# Patient Record
Sex: FEMALE | Race: WHITE | Employment: FULL TIME | URBAN - METROPOLITAN AREA
[De-identification: names, ages, dates, MRNs, and addresses within clinical notes are randomized per-mention and may not be internally consistent; named-entity substitution may affect disease eponyms.]

---
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
## 2022-01-25 ENCOUNTER — OFFICE VISIT (OUTPATIENT)
Dept: PRIMARY CARE CLINIC | Age: 22
End: 2022-01-25
Payer: COMMERCIAL

## 2022-01-25 VITALS
DIASTOLIC BLOOD PRESSURE: 60 MMHG | HEART RATE: 83 BPM | SYSTOLIC BLOOD PRESSURE: 106 MMHG | OXYGEN SATURATION: 98 % | HEIGHT: 67 IN | RESPIRATION RATE: 16 BRPM | WEIGHT: 152 LBS | BODY MASS INDEX: 23.86 KG/M2 | TEMPERATURE: 97.7 F

## 2022-01-25 DIAGNOSIS — F42.2 MIXED OBSESSIONAL THOUGHTS AND ACTS: ICD-10-CM

## 2022-01-25 DIAGNOSIS — Z00.00 HEALTH MAINTENANCE EXAMINATION: Primary | ICD-10-CM

## 2022-01-25 PROCEDURE — 99213 OFFICE O/P EST LOW 20 MIN: CPT | Performed by: FAMILY MEDICINE

## 2022-01-25 PROCEDURE — 99395 PREV VISIT EST AGE 18-39: CPT | Performed by: FAMILY MEDICINE

## 2022-01-25 RX ORDER — FLUOXETINE HYDROCHLORIDE 20 MG/1
20 CAPSULE ORAL DAILY
Qty: 30 CAPSULE | Refills: 1 | Status: SHIPPED
Start: 2022-01-25 | End: 2022-03-08 | Stop reason: SDUPTHER

## 2022-01-25 ASSESSMENT — PATIENT HEALTH QUESTIONNAIRE - PHQ9
SUM OF ALL RESPONSES TO PHQ QUESTIONS 1-9: 0
SUM OF ALL RESPONSES TO PHQ QUESTIONS 1-9: 0
SUM OF ALL RESPONSES TO PHQ9 QUESTIONS 1 & 2: 0
2. FEELING DOWN, DEPRESSED OR HOPELESS: 0
SUM OF ALL RESPONSES TO PHQ QUESTIONS 1-9: 0
1. LITTLE INTEREST OR PLEASURE IN DOING THINGS: 0
SUM OF ALL RESPONSES TO PHQ QUESTIONS 1-9: 0

## 2022-01-25 NOTE — PROGRESS NOTES
Shantel Tracey : 2000 Sex: female  Age: 25 y.o. Chief Complaint   Patient presents with    Annual Exam       HPI:   Patient presents today for physical as well as to follow-up on anxiety with continued symptoms. Follows with counselor. They are recommended she increase her medicine. Continues to have OCD tendencies. Denies depression SI/HI. Working and in school.  bw reviewed.  Defers repeat until next physical unless sxs    Most Recent Labs  CBC  Lab Results   Component Value Date    WBC 8.2 2021    RBC 4.97 2021    HGB 14.7 2021    HGB 14.1 2015    HGB 11.8 09/10/2012    HCT 42.6 2021    MCV 85.7 2021     2021      CMP  Lab Results   Component Value Date     2021    K 3.6 2021     2021    CO2 26 2021    GLUCOSE 96 2021    GLUCOSE 145 2021    BUN 12 2021    CREATININE 0.94 2021    LABGLOM >60 2021    CALCIUM 8.9 2021    LABALBU 4.6 2021    BILITOT 0.9 2021    ALKPHOS 81 2021    AST 14 2021    ALT 23 2021     A1C  No results found for: LABA1C  TSH  Lab Results   Component Value Date    TSH 0.59 2021     FREET4  No results found for: T4VUWLD  LIPID  Lab Results   Component Value Date    CHOL 176 2021    HDL 60 2021    LDLCALC 98 2021    CHOLHDLRATIO 3 2021     VITAMIN D  No results found for: VITD25  MAGNESIUM  No results found for: MG   PHOS  No results found for: PHOS   JACKY   No results found for: JACKY  RHEUMATOID FACTOR  No results found for: RF  PSA  No results found for: PSA   HEPATITIS C  No results found for: HCVABI  HIV  No results found for: VIT9OIF, HIV1QT  UA  Lab Results   Component Value Date    COLORU Yellow 2021    COLORU yellow 2015    COLORU yellow 09/10/2012    CLARITYU Clear 2021    CLARITYU clear 2015    CLARITYU clear 09/10/2012    GLUCOSEU Negative 2021    GLUCOSEU neg 07/28/2015    GLUCOSEU neg 09/10/2012    BILIRUBINUR Negative 08/23/2021    BILIRUBINUR neg 07/28/2015    BILIRUBINUR neg 09/10/2012    KETUA Negative 08/23/2021    KETUA neg 07/28/2015    KETUA trace* 09/10/2012    SPECGRAV 1.020 07/28/2015    SPECGRAV >=1.030 09/10/2012    BLOODU Negative 08/23/2021    BLOODU neg 07/28/2015    BLOODU neg 09/10/2012    PHUR 6.5 08/23/2021    PHUR 7.5 07/28/2015    PHUR 7.0 09/10/2012    PROTEINU Negative 08/23/2021    PROTEINU neg 07/28/2015    PROTEINU neg 09/10/2012    UROBILINOGEN Normal 08/23/2021    NITRU Negative 08/23/2021    LEUKOCYTESUR Negative 08/23/2021    LEUKOCYTESUR neg 07/28/2015    LEUKOCYTESUR neg 09/10/2012     Urine Micro/Albumin Ratio  No results found for: MALBCR      ROS:  Const: Denies chills, fever, malaise and sweats. Eyes: Denies discharge, pain, redness and visual disturbance. ENMT: Denies earaches, other ear symptoms. Denies nasal or sinus symptoms other than stated  above. Denies mouth and tongue lesions and sore throat. CV: Denies chest discomfort, pain; diaphoresis, dizziness, edema, lightheadedness, orthopnea,  palpitations, syncope and near syncopal episode or any exertional symptoms  Resp: Denies cough, hemoptysis, pleuritic pain, SOB, sputum production and wheezing. GI: Denies abdominal pain, change in bowel habits, hematochezia, melena, nausea and vomiting. : Denies urinary symptoms including dysuria , urgency, frequency or hematuria. Musculo: Denies musculoskeletal symptoms. Skin: Denies bruising and rash. Neuro: Denies headache, numbness, stiff neck, tingling and focal weakness slurred speech or facial  droop  Hema/Lymph: Denies bleeding/bruising tendency and enlarged lymph nodes. Health Maintenance:  Proper diet reviewed including Mediterranean and DASH diets. Counseled on healthy weight, appropriate exercise, avoidance of tobacco, and recommendations for minimal to no alcohol consumption.   Counseled on the potential pros and cons of vitamins and supplements. Reviewed the recommendations and risk/benefits of vaccines including phizer x 2, planning booster;  Td/Tdap (7/15),  pneumovax,  prevnar 13 , flu vaccine (considering), Hepatitis vaccines (had A/B series), gardasil (considering), and shingrix (patient had varicella x 2), meningococcal x 2. Had men B #1 pharmacy, she will find out which one and plan #2 soon. Ultimately planning phizer/flu vaccine soon. 1month later Men B #2 and Start gardasil. HIV and Hep C screening guidelines were reviewed. Importance of regular eye and dental exams and health reviewed. Risks/Benefits of ASA reviewed and discussed latest guidelines. Sun protection reviewed. Notify if any new or changing moles/skin lesions, etc. Dexa Scan indications/ risk factors for osteoporotic fractures and prevention reviewed. Colonoscopy recommendations reviewed. Pt denies change in bowel habits or [address] of colon polyps/CA. Fit test discussed. Indications for EKG and   additional  cardiac testing including referrals, stress testing,  2d echo, ect. Reviewed indications for other testing such as  PFT's.and  indications for imaging including brain, carotid, chest, abdominal, aortic .  dasx    Counseled on instructions regarding, and importance of monthly breast exams, yearly physician exams (sooner if sx's). Indications for mammograms reviewed and importance. Dexa Scan indications/ risk factors for osteoporotic fractures (and associated M/M) and preventative measures reviewed. Importance of regular GYN exams reviewed and pt to follow here or with her gynecologist as directed.   Not sexually active      Current Outpatient Medications:     FLUoxetine (PROZAC) 20 MG capsule, Take 1 capsule by mouth daily, Disp: 30 capsule, Rfl: 1  No Known Allergies    Past Medical History:   Diagnosis Date    Fracture, ankle 07/2018    Right - for OR 7-27-18      Past Surgical History:   Procedure Laterality Date    OPEN TX DISTAL TIBIOFIBULAR JOINT DISRUPTION Right 7/27/2018    RIGHT ANKLE OPEN REDUCTION INTERNAL FIXATION WITH SYNDESMOSIS REPAIR performed by Mirta Lopez MD at [facility] OR     Family History   Problem Relation Age of Onset    High Blood Pressure Mother     High Blood Pressure Father     Asthma Brother      Social History     Tobacco Use    Smoking status: Never Smoker    Smokeless tobacco: Never Used   Substance Use Topics    Alcohol use: No    Drug use: No      Social History     Social History Narrative    [facility] . Planning YSU        Mom - works [employer]    Dad - unemployed from [address] [place] [address] Street:    01/25/22 0836   BP: 106/60   Pulse: 83   Resp: 16   Temp: 97.7 °F (36.5 °C)   TempSrc: Temporal   SpO2: 98%   Weight: 152 lb (68.9 kg)   Height: 5' 7\" (1.702 m)         Exam:  Const: Appears comfortable. No signs of acute distress present. Head/Face: Atraumatic on inspection. Eyes: EOMI in both eyes. No discharge from the eyes. PERRL. Sclerae clear. ENMT: Auditory canals normal. Tympanic membranes: intact and translucent. External nose WNL. Nasal mucosa is clear. Oropharynx: No erythema or exudate. Posterior pharynx is normal.  Neck: Supple. Palpation reveals no adenopathy. No masses appreciated. No JVD. Carotids: no  bruits. Resp: Respirations are unlabored. Clear to auscultation. No rales, rhonchi or wheezes appreciated  over the lungs bilaterally. CV: Rate is regular. Rhythm is regular. No gallop or rubs. No heart murmur appreciated. Extremities: No clubbing, cyanosis, or edema. No calf inflammation or tenderness. Abdomen: Bowel sounds are normoactive. Abdomen is soft, nontender, and nondistended. No  abdominal masses. No palpable hepatosplenomegaly. Lymph: No palpable or visible regional lymphadenopathy. Musculoskeletal: no acute joint inflammation. Skin: Dry and warm with no rash. Skin normal to inspection and palpation overall. Neuro: Alert and oriented.  Affect: appropriate. Upper Extremities: 5/5 bilaterally. Lower Extremities:  5/5 bilaterally. Sensation intact to light touch. Reflexes: DTR's are symmetric and 2+ bilaterally. .  Cranial Nerves: Cranial nerves grossly intact. Office Labs This Visit :  No results found for this visit on 01/25/22. Assessment and Plan:    Diagnosis Orders   1. Health maintenance examination     2. Mixed obsessional thoughts and acts  FLUoxetine (PROZAC) 20 MG capsule           Health maintenance examination  Health maintenance issues discussed at length   as above, 1/25/2022. Encourage yearly. She is going to go to the pharmacy soon to get Monzon Peter booster and flu vaccine. After 1 months she is planning meningitis B #2 and starting Gardasil series. Mixed obsessional thoughts and acts  Counseled, differential reviewed. Symptoms are primarily OCD with anxiety. She states the OCD became worse in college. Increase fluoxetine to 20 mg daily. Continue formal counseling. Standard precautions. Side effects instructions ADRs reviewed. Adamantly denies SI/HI        Plan as above. Counseled extensively and differential diagnoses relevant to above were reviewed, including serious etiologies. Side effects and interactions of medications were reviewed. Full health maintenance issues addressed as above as well as other medical concerns and diagnoses as above at today's visit. Increase fluoxetine to 20 mg daily. She is going to follow-up in 6 weeks sooner as needed. At that point if 1 month have passed since Monzon Peter booster/flu vaccine she is going to get meningitis B #2 (she will find out which one she had at the pharmacy for #1) and start Gardasil series. As long as symptoms steadily improve/resolve, and medical conditions follow the expected course, FU as below, sooner PRN. Return in about 6 weeks (around 3/8/2022) for fluoxetine fu, consider Men B #2 and Start Gardasil.     Over 30 minutes  spent with the patient in reviewing records, reviewing with patient/family, counseling, ordering,  prescribing, completing h&p, etc., with over 50% of the time spent face to face counseling. Signs and symptoms to watch for discussed, serious signs and symptoms reviewed. ER if any. Laura Ayon MD    Patients are advised to check with insurance company to ensure coverage and to fully understand benefits and cost prior to any testing. This note was created with the assistance of voice recognition software. Document was reviewed however may contain grammatical errors.

## 2022-03-08 ENCOUNTER — OFFICE VISIT (OUTPATIENT)
Dept: PRIMARY CARE CLINIC | Age: 22
End: 2022-03-08
Payer: COMMERCIAL

## 2022-03-08 VITALS
WEIGHT: 158.6 LBS | SYSTOLIC BLOOD PRESSURE: 120 MMHG | HEART RATE: 83 BPM | OXYGEN SATURATION: 97 % | TEMPERATURE: 97.2 F | DIASTOLIC BLOOD PRESSURE: 68 MMHG | BODY MASS INDEX: 24.84 KG/M2

## 2022-03-08 DIAGNOSIS — F42.2 MIXED OBSESSIONAL THOUGHTS AND ACTS: ICD-10-CM

## 2022-03-08 PROCEDURE — 99213 OFFICE O/P EST LOW 20 MIN: CPT | Performed by: FAMILY MEDICINE

## 2022-03-08 RX ORDER — FLUOXETINE HYDROCHLORIDE 20 MG/1
20 CAPSULE ORAL DAILY
Qty: 90 CAPSULE | Refills: 1 | Status: SHIPPED
Start: 2022-03-08 | End: 2022-06-24 | Stop reason: SDUPTHER

## 2022-03-08 SDOH — ECONOMIC STABILITY: FOOD INSECURITY: WITHIN THE PAST 12 MONTHS, YOU WORRIED THAT YOUR FOOD WOULD RUN OUT BEFORE YOU GOT MONEY TO BUY MORE.: NEVER TRUE

## 2022-03-08 SDOH — ECONOMIC STABILITY: FOOD INSECURITY: WITHIN THE PAST 12 MONTHS, THE FOOD YOU BOUGHT JUST DIDN'T LAST AND YOU DIDN'T HAVE MONEY TO GET MORE.: NEVER TRUE

## 2022-03-08 ASSESSMENT — SOCIAL DETERMINANTS OF HEALTH (SDOH): HOW HARD IS IT FOR YOU TO PAY FOR THE VERY BASICS LIKE FOOD, HOUSING, MEDICAL CARE, AND HEATING?: NOT HARD AT ALL

## 2022-03-08 NOTE — PROGRESS NOTES
Demarcus Newellcolin : 2000 Sex: female  Age: 25 y.o. Chief Complaint   Patient presents with    Discuss Medications       HPI:   Patient presents today for follow-up. Did not get any of her vaccines yet but still plan to do so. Tolerating fluoxetine very well.       Deferring repeat blood work until next physical  Most Recent Labs  CBC  Lab Results   Component Value Date    WBC 8.2 2021    RBC 4.97 2021    HGB 14.7 2021    HGB 14.1 2015    HGB 11.8 09/10/2012    HCT 42.6 2021    MCV 85.7 2021     2021      CMP  Lab Results   Component Value Date     2021    K 3.6 2021     2021    CO2 26 2021    GLUCOSE 96 2021    GLUCOSE 145 2021    BUN 12 2021    CREATININE 0.94 2021    LABGLOM >60 2021    CALCIUM 8.9 2021    LABALBU 4.6 2021    BILITOT 0.9 2021    ALKPHOS 81 2021    AST 14 2021    ALT 23 2021     A1C  No results found for: LABA1C  TSH  Lab Results   Component Value Date    TSH 0.59 2021     FREET4  No results found for: [id number]  LIPID  Lab Results   Component Value Date    CHOL 176 2021    HDL 60 2021    LDLCALC 98 2021    CHOLHDLRATIO 3 2021     VITAMIN D  No results found for: VITD25  MAGNESIUM  No results found for: MG   PHOS  No results found for: PHOS   JACKY   No results found for: JACKY  RHEUMATOID FACTOR  No results found for: RF  PSA  No results found for: PSA   HEPATITIS C  No results found for: HCVABI  HIV  No results found for: KJE8RMX, HIV1QT  UA  Lab Results   Component Value Date    COLORU Yellow 2021    COLORU yellow 2015    COLORU yellow 09/10/2012    CLARITYU Clear 2021    CLARITYU clear 2015    CLARITYU clear 09/10/2012    GLUCOSEU Negative 2021    GLUCOSEU neg 2015    GLUCOSEU neg 09/10/2012    BILIRUBINUR Negative 2021    BILIRUBINUR neg 2015    BILIRUBINUR neg 09/10/2012    KETUA Negative 08/23/2021    KETUA neg 07/28/2015    KETUA trace* 09/10/2012    SPECGRAV 1.020 07/28/2015    SPECGRAV >=1.030 09/10/2012    BLOODU Negative 08/23/2021    BLOODU neg 07/28/2015    BLOODU neg 09/10/2012    PHUR 6.5 08/23/2021    PHUR 7.5 07/28/2015    PHUR 7.0 09/10/2012    PROTEINU Negative 08/23/2021    PROTEINU neg 07/28/2015    PROTEINU neg 09/10/2012    UROBILINOGEN Normal 08/23/2021    NITRU Negative 08/23/2021    LEUKOCYTESUR Negative 08/23/2021    LEUKOCYTESUR neg 07/28/2015    LEUKOCYTESUR neg 09/10/2012     Urine Micro/Albumin Ratio  No results found for: MALBCR      ROS:  Const: Denies chills, fever, malaise and sweats. Eyes: Denies discharge, pain, redness and visual disturbance. ENMT: Denies earaches, other ear symptoms. Denies nasal or sinus symptoms other than stated  above. Denies mouth and tongue lesions and sore throat. CV: Denies chest discomfort, pain; diaphoresis, dizziness, edema, lightheadedness, orthopnea,  palpitations, syncope and near syncopal episode or any exertional symptoms  Resp: Denies cough, hemoptysis, pleuritic pain, SOB, sputum production and wheezing. GI: Denies abdominal pain, change in bowel habits, hematochezia, melena, nausea and vomiting. : Denies urinary symptoms including dysuria , urgency, frequency or hematuria. Musculo: Denies musculoskeletal symptoms. Skin: Denies bruising and rash. Neuro: Denies headache, numbness, stiff neck, tingling and focal weakness slurred speech or facial  droop  Hema/Lymph: Denies bleeding/bruising tendency and enlarged lymph nodes. Health Maintenance:  Reviewed the recommendations and risk/benefits of vaccines including phizer x 2, planning booster;  Td/Tdap (7/15),  pneumovax,  prevnar 13 , flu vaccine (considering), Hepatitis vaccines (had A/B series), gardasil (considering), and shingrix (patient had varicella x 2), meningococcal x 2.  Had men B #1 pharmacy, she will find out which one and plan #2 soon. Ultimately planning phizer/flu vaccine soon. Afterward, men B #2 and Start gardasil. Current Outpatient Medications:     FLUoxetine (PROZAC) 20 MG capsule, Take 1 capsule by mouth daily, Disp: 90 capsule, Rfl: 1  No Known Allergies    Past Medical History:   Diagnosis Date    Fracture, ankle 07/2018    Right - for OR 7-27-18      Past Surgical History:   Procedure Laterality Date    OPEN TX DISTAL TIBIOFIBULAR JOINT DISRUPTION Right 7/27/2018    RIGHT ANKLE OPEN REDUCTION INTERNAL FIXATION WITH SYNDESMOSIS REPAIR performed by Horace Padilla [facility]     Family History   Problem Relation Age of Onset    High Blood Pressure Mother     High Blood Pressure Father     Asthma Brother      Social History     Tobacco Use    Smoking status: Never Smoker    Smokeless tobacco: Never Used   Substance Use Topics    Alcohol use: No    Drug use: No      Social History     Social History Narrative    [facility] . Planning YSU        Mom - works [employer]    Dad - unemployed from [address] [place] [address] Street:    03/08/22 1438   BP: 120/68   Site: Left Upper Arm   Position: Sitting   Pulse: 83   Temp: 97.2 °F (36.2 °C)   TempSrc: Temporal   SpO2: 97%   Weight: 158 lb 9.6 oz (71.9 kg)         Exam:  Const: Appears comfortable. No signs of acute distress present. Head/Face: Atraumatic on inspection. Eyes: EOMI in both eyes. No discharge from the eyes. PERRL. Sclerae clear. ENMT: Auditory canals normal. Tympanic membranes: intact and translucent. External nose WNL. Nasal mucosa is clear. Oropharynx: No erythema or exudate. Posterior pharynx is normal.  Neck: Supple. Palpation reveals no adenopathy. No masses appreciated. No JVD. Carotids: no  bruits. Resp: Respirations are unlabored. Clear to auscultation. No rales, rhonchi or wheezes appreciated  over the lungs bilaterally. CV: Rate is regular. Rhythm is regular. No gallop or rubs. No heart murmur appreciated.   Extremities: No clubbing, cyanosis, or edema. No calf inflammation or tenderness. Abdomen: Bowel sounds are normoactive. Abdomen is soft, nontender, and nondistended. No  abdominal masses. No palpable hepatosplenomegaly. Lymph: No palpable or visible regional lymphadenopathy. Musculoskeletal: no acute joint inflammation. Skin: Dry and warm with no rash. Skin normal to inspection and palpation overall. Neuro: Alert and oriented. Affect: appropriate. Upper Extremities: 5/5 bilaterally. Lower Extremities:  5/5 bilaterally. Sensation intact to light touch. Reflexes: DTR's are symmetric and 2+ bilaterally. .  Cranial Nerves: Cranial nerves grossly intact. Office Labs This Visit :  No results found for this visit on 03/08/22. Assessment and Plan:    Diagnosis Orders   1. Mixed obsessional thoughts and acts  FLUoxetine (PROZAC) 20 MG capsule           Health maintenance examination  Health maintenance issues discussed at length  1/25/2022. Encourage yearly. She is going to go to the pharmacy soon to get Monzon Peter booster and flu vaccine. She had her first meningitis B vaccine at [facility], she is going to find out which when she had. She is going to follow-up in about 3 months for  meningitis B #2 and start Gardasil series. Then she is planning nursing visits for Gardasil series with a follow-up 1 year from last physical sooner as needed    Mixed obsessional thoughts and acts  Counseled, differential reviewed. Symptoms are primarily OCD with anxiety. She states the OCD became worse in college. Doing much better with increased fluoxetine of 20 mg daily. Continue formal counseling. Standard precautions. Side effects instructions ADRs reviewed. Adamantly denies SI/HI. Refills given        Plan as above. Counseled extensively and differential diagnoses relevant to above were reviewed, including serious etiologies. Side effects and interactions of medications were reviewed.     Counseled, refills of fluoxetine given,, Going to follow-up in 3 months and we are planning meningitis B #2 and Gardasil series initiation then, then she is planning nursing visits for remainder Gardasil series and follow-up with me 1 year from last physical i.e. January 2023, sooner as needed. She is planning Covid booster and flu shot soon  As long as symptoms steadily improve/resolve, and medical conditions follow the expected course, FU as below, sooner PRN. Return in about 3 months (around 6/8/2022), or if symptoms worsen or fail to improve, for fu fluoxetine and plan MEN B #2 and start gardasil then. Signs and symptoms to watch for discussed, serious signs and symptoms reviewed. ER if any. Anahi Ly MD    Patients are advised to check with insurance company to ensure coverage and to fully understand benefits and cost prior to any testing. This note was created with the assistance of voice recognition software. Document was reviewed however may contain grammatical errors.

## 2022-06-24 ENCOUNTER — OFFICE VISIT (OUTPATIENT)
Dept: PRIMARY CARE CLINIC | Age: 22
End: 2022-06-24
Payer: COMMERCIAL

## 2022-06-24 VITALS
DIASTOLIC BLOOD PRESSURE: 62 MMHG | OXYGEN SATURATION: 97 % | HEART RATE: 75 BPM | WEIGHT: 154 LBS | SYSTOLIC BLOOD PRESSURE: 122 MMHG | TEMPERATURE: 97.3 F | BODY MASS INDEX: 24.12 KG/M2

## 2022-06-24 DIAGNOSIS — F42.2 MIXED OBSESSIONAL THOUGHTS AND ACTS: Primary | ICD-10-CM

## 2022-06-24 DIAGNOSIS — Z00.00 HEALTH MAINTENANCE EXAMINATION: ICD-10-CM

## 2022-06-24 PROCEDURE — 99214 OFFICE O/P EST MOD 30 MIN: CPT | Performed by: FAMILY MEDICINE

## 2022-06-24 PROCEDURE — 90471 IMMUNIZATION ADMIN: CPT | Performed by: FAMILY MEDICINE

## 2022-06-24 PROCEDURE — 90651 9VHPV VACCINE 2/3 DOSE IM: CPT | Performed by: FAMILY MEDICINE

## 2022-06-24 RX ORDER — FLUOXETINE HYDROCHLORIDE 40 MG/1
40 CAPSULE ORAL DAILY
Qty: 30 CAPSULE | Refills: 1 | Status: SHIPPED | OUTPATIENT
Start: 2022-06-24

## 2022-06-24 NOTE — PROGRESS NOTES
Wallace Clause : 2000 Sex: female  Age: 25 y.o. Chief Complaint   Patient presents with    Medication Check    Immunizations       HPI:   Patient presents today for follow-up. She did get confirmation on the meningitis B vaccine that she got at [facility] but again cannot recall which 1. She is planning to get her booster there. However would like to start Gardasil today. I do generally recommend  shots by 1 month if not given together. Anxiety much better on fluoxetine and tolerating it well but still significant rituals and OCD habits. Follows with counselor.     Deferring repeat blood work until next physical  Most Recent Labs  CBC  Lab Results   Component Value Date    WBC 8.2 2021    RBC 4.97 2021    HGB 14.7 2021    HGB 14.1 2015    HGB 11.8 09/10/2012    HCT 42.6 2021    MCV 85.7 2021     2021      CMP  Lab Results   Component Value Date     2021    K 3.6 2021     2021    CO2 26 2021    GLUCOSE 96 2021    GLUCOSE 145 2021    BUN 12 2021    CREATININE 0.94 2021    LABGLOM >60 2021    CALCIUM 8.9 2021    LABALBU 4.6 2021    BILITOT 0.9 2021    ALKPHOS 81 2021    AST 14 2021    ALT 23 2021     A1C  No results found for: LABA1C  TSH  Lab Results   Component Value Date    TSH 0.59 2021     FREET4  No results found for: [id number]  LIPID  Lab Results   Component Value Date    CHOL 176 2021    HDL 60 2021    LDLCALC 98 2021    CHOLHDLRATIO 3 2021     VITAMIN D  No results found for: VITD25  MAGNESIUM  No results found for: MG   PHOS  No results found for: PHOS   JACKY   No results found for: JACKY  RHEUMATOID FACTOR  No results found for: RF  PSA  No results found for: PSA   HEPATITIS C  No results found for: HCVABI  HIV  No results found for: IQE6FLH, HIV1QT  UA  Lab Results   Component Value Date    COLORU Yellow 08/23/2021    COLORU yellow 07/28/2015    COLORU yellow 09/10/2012    CLARITYU Clear 08/23/2021    CLARITYU clear 07/28/2015    CLARITYU clear 09/10/2012    GLUCOSEU Negative 08/23/2021    GLUCOSEU neg 07/28/2015    GLUCOSEU neg 09/10/2012    BILIRUBINUR Negative 08/23/2021    BILIRUBINUR neg 07/28/2015    BILIRUBINUR neg 09/10/2012    KETUA Negative 08/23/2021    KETUA neg 07/28/2015    KETUA trace* 09/10/2012    SPECGRAV 1.020 07/28/2015    SPECGRAV >=1.030 09/10/2012    BLOODU Negative 08/23/2021    BLOODU neg 07/28/2015    BLOODU neg 09/10/2012    PHUR 6.5 08/23/2021    PHUR 7.5 07/28/2015    PHUR 7.0 09/10/2012    PROTEINU Negative 08/23/2021    PROTEINU neg 07/28/2015    PROTEINU neg 09/10/2012    UROBILINOGEN Normal 08/23/2021    NITRU Negative 08/23/2021    LEUKOCYTESUR Negative 08/23/2021    LEUKOCYTESUR neg 07/28/2015    LEUKOCYTESUR neg 09/10/2012     Urine Micro/Albumin Ratio  No results found for: MALBCR      ROS:  Const: Denies chills, fever, malaise and sweats. Eyes: Denies discharge, pain, redness and visual disturbance. ENMT: Denies earaches, other ear symptoms. Denies nasal or sinus symptoms other than stated  above. Denies mouth and tongue lesions and sore throat. CV: Denies chest discomfort, pain; diaphoresis, dizziness, edema, lightheadedness, orthopnea,  palpitations, syncope and near syncopal episode or any exertional symptoms  Resp: Denies cough, hemoptysis, pleuritic pain, SOB, sputum production and wheezing. GI: Denies abdominal pain, change in bowel habits, hematochezia, melena, nausea and vomiting. : Denies urinary symptoms including dysuria , urgency, frequency or hematuria. Musculo: Denies musculoskeletal symptoms. Skin: Denies bruising and rash. Neuro: Denies headache, numbness, stiff neck, tingling and focal weakness slurred speech or facial  droop  Hema/Lymph: Denies bleeding/bruising tendency and enlarged lymph nodes.       Health Maintenance:  Reviewed the recommendations and risk/benefits of vaccines including phizer x 2, planning booster;  Td/Tdap (7/15),  pneumovax,  prevnar 13 , flu vaccine (considering), Hepatitis vaccines (had A/B series), gardasil (she would like to start), and shingrix (patient had varicella x 2), meningococcal x 2. Had men B #1 pharmacy, she will get booster through pharmacy she states, offered her if she knew which 1 she got    Current Outpatient Medications:     FLUoxetine (PROZAC) 40 MG capsule, Take 1 capsule by mouth daily, Disp: 30 capsule, Rfl: 1  No Known Allergies    Past Medical History:   Diagnosis Date    Fracture, ankle 07/2018    Right - for OR 7-27-18      Past Surgical History:   Procedure Laterality Date    OPEN TX DISTAL TIBIOFIBULAR JOINT DISRUPTION Right 7/27/2018    RIGHT ANKLE OPEN REDUCTION INTERNAL FIXATION WITH SYNDESMOSIS REPAIR performed by Jake Christopher MD at [facility] OR     Family History   Problem Relation Age of Onset    High Blood Pressure Mother     High Blood Pressure Father     Asthma Brother      Social History     Tobacco Use    Smoking status: Never Smoker    Smokeless tobacco: Never Used   Substance Use Topics    Alcohol use: No    Drug use: No      Social History     Social History Narrative    [facility]es [facility] . Planning YSU        Mom - works [employer]    Dad - unemployed from [address] [place] [address] Street:    06/24/22 1018   BP: 122/62   Pulse: 75   Temp: 97.3 °F (36.3 °C)   SpO2: 97%   Weight: 154 lb (69.9 kg)         Exam:  Const: Appears comfortable. No signs of acute distress present. Head/Face: Atraumatic on inspection. Eyes: EOMI in both eyes. No discharge from the eyes. PERRL. Sclerae clear. ENMT: Auditory canals normal. Tympanic membranes: intact and translucent. External nose WNL. Nasal mucosa is clear. Oropharynx: No erythema or exudate. Posterior pharynx is normal.  Neck: Supple. Palpation reveals no adenopathy. No masses appreciated. No JVD. Carotids: no  bruits.   Resp: Respirations are unlabored. Clear to auscultation. No rales, rhonchi or wheezes appreciated  over the lungs bilaterally. CV: Rate is regular. Rhythm is regular. No gallop or rubs. No heart murmur appreciated. Extremities: No clubbing, cyanosis, or edema. No calf inflammation or tenderness. Abdomen: Bowel sounds are normoactive. Abdomen is soft, nontender, and nondistended. No  abdominal masses. No palpable hepatosplenomegaly. Lymph: No palpable or visible regional lymphadenopathy. Musculoskeletal: no acute joint inflammation. Skin: Dry and warm with no rash. Skin normal to inspection and palpation overall. Neuro: Alert and oriented. Affect: appropriate. Upper Extremities: 5/5 bilaterally. Lower Extremities:  5/5 bilaterally. Sensation intact to light touch. Reflexes: DTR's are symmetric and 2+ bilaterally. .  Cranial Nerves: Cranial nerves grossly intact. Office Labs This Visit :  No results found for this visit on 06/24/22. Assessment and Plan:    Diagnosis Orders   1. Mixed obsessional thoughts and acts  FLUoxetine (PROZAC) 40 MG capsule   2. Health maintenance examination  HPV, GARDASIL 9, (AGE 9-45 YRS), IM           Health maintenance examination  Health maintenance issues discussed at length  1/25/2022. Encourage yearly. She had her first meningitis B vaccine at [facility], she is going to get booster there, separate vaccines by at least a month if not given together, would like to start Gardasil today      Mixed obsessional thoughts and acts  Counseled, differential reviewed. Symptoms are primarily OCD with anxiety. She states the OCD became worse in college. Anxiety is much better with increased fluoxetine of 20 mg daily. ,  However OCD rituals still persistent. She would like to try 40 mg, pros and cons reviewed. Continue formal counseling. Standard precautions. Side effects instructions ADRs reviewed. Adamantly denies SI/HI. Prescription given        Plan as above.   Counseled extensively and differential diagnoses relevant to above were reviewed, including serious etiologies, risks and complications, especially of left uncontrolled. If relevant, instructions and  alternatives to meds/treatment reviewed, as well as interactions, and  SE's/ADRs reviewed, notify immediately if any, discontinuing new meds if any. Plan made after discussion and shared decision making. Counseled, increase fluoxetine, start Gardasil series, follow-up 3 weeks on fluoxetine sooner as needed, 2 months for Gardasil No. 2 in 6 months for Gardasil No. 3.  She will get meningitis B vaccine at pharmacy unless she finds out which when she got we can give it here      As long as symptoms steadily improve/resolve, and medical conditions follow the expected course, FU as below, sooner PRN. Return for 3wk fu fluoxetine, 2mos gardasil #2, 6mos gardasil 3. Educational materials and/or home exercises printed for patient's review and were included in patient instructions on his/her After Visit Summary and given to patient at the end of visit. After discussion, patient and/or guardian verbalizes understanding, agrees, feels comfortable with and wishes to proceed with above treatment plan. Call for any pending results, FU sooner if abnormal, as needed or if any current symptoms persist/worsen. Advised patient to call with any new medication issues, and read all Rx info from pharmacy to assure aware of all possible risks and side effects of medication before taking. Reviewed age and gender appropriate health screening exams and vaccinations. Advised patient regarding importance of keeping up with recommended health maintenance and to schedule as soon as possible if overdue, as this is important in assessing for undiagnosed pathology, especially cancer, as well as protecting against potentially harmful/life threatening disease.           Patient and/or guardian verbalizes understanding and agrees with above counseling, assessment and plan. All questions answered. Over 30 minutes  spent with the patient in reviewing records, reviewing with patient/family, counseling, ordering,  prescribing, completing h&p, etc., with over 50% of the time spent face to face counseling. Signs and symptoms to watch for discussed, serious signs and symptoms reviewed. ER if any. Chano Osborn MD    Patients are advised to check with insurance company to ensure coverage and to fully understand benefits and cost prior to any testing. This note was created with the assistance of voice recognition software. Document was reviewed however may contain grammatical errors.

## 2022-07-15 ENCOUNTER — OFFICE VISIT (OUTPATIENT)
Dept: PRIMARY CARE CLINIC | Age: 22
End: 2022-07-15
Payer: COMMERCIAL

## 2022-07-15 VITALS
BODY MASS INDEX: 24.45 KG/M2 | TEMPERATURE: 97.5 F | OXYGEN SATURATION: 98 % | HEIGHT: 67 IN | RESPIRATION RATE: 18 BRPM | WEIGHT: 155.8 LBS | SYSTOLIC BLOOD PRESSURE: 102 MMHG | DIASTOLIC BLOOD PRESSURE: 70 MMHG | HEART RATE: 72 BPM

## 2022-07-15 DIAGNOSIS — F42.2 MIXED OBSESSIONAL THOUGHTS AND ACTS: Primary | ICD-10-CM

## 2022-07-15 DIAGNOSIS — R73.9 HYPERGLYCEMIA: ICD-10-CM

## 2022-07-15 DIAGNOSIS — Z00.00 HEALTH MAINTENANCE EXAMINATION: ICD-10-CM

## 2022-07-15 PROCEDURE — 99213 OFFICE O/P EST LOW 20 MIN: CPT | Performed by: FAMILY MEDICINE

## 2022-07-15 ASSESSMENT — PATIENT HEALTH QUESTIONNAIRE - PHQ9
SUM OF ALL RESPONSES TO PHQ QUESTIONS 1-9: 1
1. LITTLE INTEREST OR PLEASURE IN DOING THINGS: 1
SUM OF ALL RESPONSES TO PHQ QUESTIONS 1-9: 1
SUM OF ALL RESPONSES TO PHQ QUESTIONS 1-9: 1
2. FEELING DOWN, DEPRESSED OR HOPELESS: 0
SUM OF ALL RESPONSES TO PHQ9 QUESTIONS 1 & 2: 1
SUM OF ALL RESPONSES TO PHQ QUESTIONS 1-9: 1

## 2022-07-15 NOTE — PROGRESS NOTES
Denny Mccray : 2000 Sex: female  Age: 25 y.o. Chief Complaint   Patient presents with    Medication Check     Pt states she really likes the medication and it is helping. HPI:   Patient presents today for follow-up. She did not yet get confirmation which meningitis B vaccine she got at [facility]. She is planning to get a booster there. Last visit fluoxetine was increased, she is tolerating well and her OCD rituals have significantly improved as has her anxiety. She is pleased with the results. Follows with counselor.   No SI/HI      Deferring repeat blood work until next physical  Most Recent Labs  CBC  Lab Results   Component Value Date/Time    WBC 8.2 2021 12:00 AM    RBC 4.97 2021 12:00 AM    HGB 14.7 2021 12:00 AM    HGB 14.1 2015 12:00 AM    HGB 11.8 09/10/2012 04:13 PM    HCT 42.6 2021 12:00 AM    MCV 85.7 2021 12:00 AM     2021 12:00 AM      CMP  Lab Results   Component Value Date/Time     2021 12:00 AM    K 3.6 2021 12:00 AM     2021 12:00 AM    CO2 26 2021 12:00 AM    GLUCOSE 96 2021 09:48 AM    GLUCOSE 145 2021 12:00 AM    BUN 12 2021 12:00 AM    CREATININE 0.94 2021 12:00 AM    LABGLOM >60 2021 12:00 AM    CALCIUM 8.9 2021 12:00 AM    LABALBU 4.6 2021 12:00 AM    BILITOT 0.9 2021 12:00 AM    ALKPHOS 81 2021 12:00 AM    AST 14 2021 12:00 AM    ALT 23 2021 12:00 AM     A1C  No results found for: LABA1C  TSH  Lab Results   Component Value Date/Time    TSH 0.59 2021 12:00 AM     FREET4  No results found for: [id number]  LIPID  Lab Results   Component Value Date/Time    CHOL 176 2021 12:00 AM    HDL 60 2021 12:00 AM    LDLCALC 98 2021 12:00 AM    CHOLHDLRATIO 3 2021 12:00 AM     VITAMIN D  No results found for: VITD25  MAGNESIUM  No results found for: MG   PHOS  No results found for: PHOS   JACKY   No results found for: JACKY  RHEUMATOID FACTOR  No results found for: RF  PSA  No results found for: PSA   HEPATITIS C  No results found for: HCVABI  HIV  No results found for: UOZ1AZX, HIV1QT  UA  Lab Results   Component Value Date/Time    COLORU Yellow 08/23/2021 12:00 AM    COLORU yellow 07/28/2015 12:00 AM    COLORU yellow 09/10/2012 04:15 PM    CLARITYU Clear 08/23/2021 12:00 AM    CLARITYU clear 07/28/2015 12:00 AM    CLARITYU clear 09/10/2012 04:15 PM    GLUCOSEU Negative 08/23/2021 12:00 AM    GLUCOSEU neg 07/28/2015 12:00 AM    GLUCOSEU neg 09/10/2012 04:15 PM    BILIRUBINUR Negative 08/23/2021 12:00 AM    BILIRUBINUR neg 07/28/2015 12:00 AM    BILIRUBINUR neg 09/10/2012 04:15 PM    KETUA Negative 08/23/2021 12:00 AM    KETUA neg 07/28/2015 12:00 AM    KETUA trace* 09/10/2012 04:15 PM    SPECGRAV 1.020 07/28/2015 12:00 AM    SPECGRAV >=1.030 09/10/2012 04:15 PM    BLOODU Negative 08/23/2021 12:00 AM    BLOODU neg 07/28/2015 12:00 AM    BLOODU neg 09/10/2012 04:15 PM    PHUR 6.5 08/23/2021 12:00 AM    PHUR 7.5 07/28/2015 12:00 AM    PHUR 7.0 09/10/2012 04:15 PM    PROTEINU Negative 08/23/2021 12:00 AM    PROTEINU neg 07/28/2015 12:00 AM    PROTEINU neg 09/10/2012 04:15 PM    UROBILINOGEN Normal 08/23/2021 12:00 AM    NITRU Negative 08/23/2021 12:00 AM    LEUKOCYTESUR Negative 08/23/2021 12:00 AM    LEUKOCYTESUR neg 07/28/2015 12:00 AM    LEUKOCYTESUR neg 09/10/2012 04:15 PM     Urine Micro/Albumin Ratio  No results found for: MALBCR      ROS:  Const: Denies chills, fever, malaise and sweats. Eyes: Denies discharge, pain, redness and visual disturbance. ENMT: Denies earaches, other ear symptoms. Denies nasal or sinus symptoms other than stated  above. Denies mouth and tongue lesions and sore throat.   CV: Denies chest discomfort, pain; diaphoresis, dizziness, edema, lightheadedness, orthopnea,  palpitations, syncope and near syncopal episode or any exertional symptoms  Resp: Denies cough, hemoptysis, pleuritic pain, SOB, sputum production and wheezing. GI: Denies abdominal pain, change in bowel habits, hematochezia, melena, nausea and vomiting. : Denies urinary symptoms including dysuria , urgency, frequency or hematuria. Musculo: Denies musculoskeletal symptoms. Skin: Denies bruising and rash. Neuro: Denies headache, numbness, stiff neck, tingling and focal weakness slurred speech or facial  droop  Hema/Lymph: Denies bleeding/bruising tendency and enlarged lymph nodes. Current Outpatient Medications:     FLUoxetine (PROZAC) 40 MG capsule, Take 1 capsule by mouth daily, Disp: 30 capsule, Rfl: 1  No Known Allergies    Past Medical History:   Diagnosis Date    Fracture, ankle 07/2018    Right - for OR 7-27-18      Past Surgical History:   Procedure Laterality Date    OPEN TX DISTAL TIBIOFIBULAR JOINT DISRUPTION Right 7/27/2018    RIGHT ANKLE OPEN REDUCTION INTERNAL FIXATION WITH SYNDESMOSIS REPAIR performed by Fernie Saavedra MD at [facility] OR     Family History   Problem Relation Age of Onset    High Blood Pressure Mother     High Blood Pressure Father     Asthma Brother      Social History     Tobacco Use    Smoking status: Never    Smokeless tobacco: Never   Substance Use Topics    Alcohol use: No    Drug use: No      Social History     Social History Narrative    [facility]es [facility] [employer] [facility] . Planning YSU        Mom - works [employer]    Dad - unemployed from [address] [place] [address] Street:    07/15/22 1131   BP: 102/70   Pulse: 72   Resp: 18   Temp: 97.5 °F (36.4 °C)   TempSrc: Temporal   SpO2: 98%   Weight: 155 lb 12.8 oz (70.7 kg)   Height: 5' 7\" (1.702 m)         Exam:  Const: Appears comfortable. No signs of acute distress present. Head/Face: Atraumatic on inspection. Eyes: EOMI in both eyes. No discharge from the eyes. PERRL. Sclerae clear. ENMT: Auditory canals normal. Tympanic membranes: intact and translucent. External nose WNL. Nasal mucosa is clear. Oropharynx: No erythema or exudate.  Posterior pharynx is better with increased fluoxetine of 20 mg daily. ,  However OCD rituals still persistent. Now resolved on 40 mg, pros and cons reviewed. Continue formal counseling. Standard precautions. Side effects instructions ADRs reviewed. Adamantly denies SI/HI. Prescription given    Hyperglycemia  This was nonfasting after significant sugar intake, repeat the next day was 96. Defers blood work until January    Plan as above. Counseled extensively and differential diagnoses relevant to above were reviewed, including serious etiologies, risks and complications, especially of left uncontrolled. If relevant, instructions and  alternatives to meds/treatment reviewed, as well as interactions, and  SE's/ADRs reviewed, notify immediately if any, discontinuing new meds if any. Plan made after discussion and shared decision making. Continue current regimen, follow-up as below and then yearly. As long as symptoms steadily improve/resolve, and medical conditions follow the expected course, FU as below, sooner PRN. Return for >8/24 , gardasil #2 (review fluoxetine); >1/25 for physical and gardasil #3. Educational materials and/or home exercises printed for patient's review and were included in patient instructions on his/her After Visit Summary and given to patient at the end of visit. After discussion, patient and/or guardian verbalizes understanding, agrees, feels comfortable with and wishes to proceed with above treatment plan. Call for any pending results, FU sooner if abnormal, as needed or if any current symptoms persist/worsen. Advised patient to call with any new medication issues, and read all Rx info from pharmacy to assure aware of all possible risks and side effects of medication before taking. Reviewed age and gender appropriate health screening exams and vaccinations.   Advised patient regarding importance of keeping up with recommended health maintenance and to schedule as soon as possible if overdue, as this is important in assessing for undiagnosed pathology, especially cancer, as well as protecting against potentially harmful/life threatening disease. Patient and/or guardian verbalizes understanding and agrees with above counseling, assessment and plan. All questions answered. Over 30 minutes  spent with the patient in reviewing records, reviewing with patient/family, counseling, ordering,  prescribing, completing h&p, etc., with over 50% of the time spent face to face counseling. Signs and symptoms to watch for discussed, serious signs and symptoms reviewed. ER if any. Chelsey Hays MD    Patients are advised to check with insurance company to ensure coverage and to fully understand benefits and cost prior to any testing. This note was created with the assistance of voice recognition software. Document was reviewed however may contain grammatical errors.

## 2022-11-15 DIAGNOSIS — F42.2 MIXED OBSESSIONAL THOUGHTS AND ACTS: ICD-10-CM

## 2022-11-15 RX ORDER — FLUOXETINE HYDROCHLORIDE 40 MG/1
40 CAPSULE ORAL DAILY
Qty: 30 CAPSULE | Refills: 1 | Status: SHIPPED | OUTPATIENT
Start: 2022-11-15

## 2022-11-15 NOTE — TELEPHONE ENCOUNTER
Name of Medication(s) Requested:  Fluoxetine 40mg 1 qd    Pharmacy Requested:   ok    Medication(s) pended? [x] Yes  [] No    Last Appointment:  7/15/2022    Future appts:  No future appointments. Does patient need call back?   [] Yes  [x] No

## 2023-02-06 DIAGNOSIS — F42.2 MIXED OBSESSIONAL THOUGHTS AND ACTS: ICD-10-CM

## 2023-02-06 RX ORDER — FLUOXETINE HYDROCHLORIDE 40 MG/1
40 CAPSULE ORAL DAILY
Qty: 30 CAPSULE | Refills: 1 | Status: SHIPPED | OUTPATIENT
Start: 2023-02-06

## 2023-02-06 NOTE — TELEPHONE ENCOUNTER
Medication and pharmacy info verified with the pt       Last Appointment:  7/15/2022    Future appts:  Future Appointments   Date Time Provider Castro Dorsey   2/17/2023  4:30 PM [facility]

## 2023-03-17 DIAGNOSIS — F42.2 MIXED OBSESSIONAL THOUGHTS AND ACTS: ICD-10-CM

## 2023-03-17 RX ORDER — FLUOXETINE HYDROCHLORIDE 40 MG/1
40 CAPSULE ORAL DAILY
Qty: 30 CAPSULE | Refills: 1 | Status: SHIPPED
Start: 2023-03-17 | End: 2023-03-20 | Stop reason: SDUPTHER

## 2023-03-17 NOTE — TELEPHONE ENCOUNTER
----- Message from Amanda Stanford sent at 3/17/2023 10:52 AM EDT -----  Subject: Refill Request    QUESTIONS  Name of Medication? FLUoxetine (PROZAC) 40 MG capsule  Patient-reported dosage and instructions? 40mg  How many days do you have left? 2  [id number]  Pharmacy phone number (if available)? [phone]  ---------------------------------------------------------------------------  --------------  CALL BACK INFO  What is the best way for the office to contact you? OK to leave message on   voicemail  Preferred Call Back Phone Number? [phone]  ---------------------------------------------------------------------------  --------------  SCRIPT ANSWERS  Relationship to Patient?  Self

## 2023-03-17 NOTE — TELEPHONE ENCOUNTER
I did send 30 with 1 refill February.   If she does not have that refill let me know and I can send again, I see she has appointment March 20

## 2023-03-20 ENCOUNTER — OFFICE VISIT (OUTPATIENT)
Dept: PRIMARY CARE CLINIC | Age: 23
End: 2023-03-20
Payer: COMMERCIAL

## 2023-03-20 VITALS
HEART RATE: 76 BPM | TEMPERATURE: 98 F | DIASTOLIC BLOOD PRESSURE: 60 MMHG | WEIGHT: 165 LBS | BODY MASS INDEX: 25.9 KG/M2 | SYSTOLIC BLOOD PRESSURE: 118 MMHG | HEIGHT: 67 IN | OXYGEN SATURATION: 98 %

## 2023-03-20 DIAGNOSIS — F42.2 MIXED OBSESSIONAL THOUGHTS AND ACTS: ICD-10-CM

## 2023-03-20 DIAGNOSIS — Z23 ENCOUNTER FOR IMMUNIZATION: ICD-10-CM

## 2023-03-20 DIAGNOSIS — Z00.00 HEALTH MAINTENANCE EXAMINATION: Primary | ICD-10-CM

## 2023-03-20 PROCEDURE — 90471 IMMUNIZATION ADMIN: CPT | Performed by: FAMILY MEDICINE

## 2023-03-20 PROCEDURE — 90651 9VHPV VACCINE 2/3 DOSE IM: CPT | Performed by: FAMILY MEDICINE

## 2023-03-20 PROCEDURE — 99395 PREV VISIT EST AGE 18-39: CPT | Performed by: FAMILY MEDICINE

## 2023-03-20 RX ORDER — FLUOXETINE HYDROCHLORIDE 40 MG/1
40 CAPSULE ORAL DAILY
Qty: 90 CAPSULE | Refills: 3 | Status: SHIPPED | OUTPATIENT
Start: 2023-03-20

## 2023-03-20 SDOH — ECONOMIC STABILITY: FOOD INSECURITY: WITHIN THE PAST 12 MONTHS, YOU WORRIED THAT YOUR FOOD WOULD RUN OUT BEFORE YOU GOT MONEY TO BUY MORE.: NEVER TRUE

## 2023-03-20 SDOH — ECONOMIC STABILITY: INCOME INSECURITY: HOW HARD IS IT FOR YOU TO PAY FOR THE VERY BASICS LIKE FOOD, HOUSING, MEDICAL CARE, AND HEATING?: NOT HARD AT ALL

## 2023-03-20 SDOH — ECONOMIC STABILITY: FOOD INSECURITY: WITHIN THE PAST 12 MONTHS, THE FOOD YOU BOUGHT JUST DIDN'T LAST AND YOU DIDN'T HAVE MONEY TO GET MORE.: NEVER TRUE

## 2023-03-20 SDOH — ECONOMIC STABILITY: HOUSING INSECURITY
IN THE LAST 12 MONTHS, WAS THERE A TIME WHEN YOU DID NOT HAVE A STEADY PLACE TO SLEEP OR SLEPT IN A SHELTER (INCLUDING NOW)?: NO

## 2023-03-20 ASSESSMENT — PATIENT HEALTH QUESTIONNAIRE - PHQ9
1. LITTLE INTEREST OR PLEASURE IN DOING THINGS: 0
SUM OF ALL RESPONSES TO PHQ QUESTIONS 1-9: 0
SUM OF ALL RESPONSES TO PHQ9 QUESTIONS 1 & 2: 0
SUM OF ALL RESPONSES TO PHQ QUESTIONS 1-9: 0
2. FEELING DOWN, DEPRESSED OR HOPELESS: 0
SUM OF ALL RESPONSES TO PHQ QUESTIONS 1-9: 0
SUM OF ALL RESPONSES TO PHQ QUESTIONS 1-9: 0

## 2023-03-20 NOTE — PROGRESS NOTES
CHOL 176 08/23/2021 12:00 AM    HDL 60 08/23/2021 12:00 AM    LDLCALC 98 08/23/2021 12:00 AM    CHOLHDLRATIO 3 08/23/2021 12:00 AM     VITAMIN D  No results found for: VITD25  MAGNESIUM  No results found for: MG   PHOS  No results found for: PHOS   JACKY   No results found for: JACKY  RHEUMATOID FACTOR  No results found for: RF  PSA  No results found for: PSA   HEPATITIS C  No results found for: HCVABI  HIV  No results found for: AST5GAI, HIV1QT  UA  Lab Results   Component Value Date/Time    COLORU Yellow 08/23/2021 12:00 AM    COLORU yellow 07/28/2015 12:00 AM    COLORU yellow 09/10/2012 04:15 PM    CLARITYU Clear 08/23/2021 12:00 AM    CLARITYU clear 07/28/2015 12:00 AM    CLARITYU clear 09/10/2012 04:15 PM    GLUCOSEU Negative 08/23/2021 12:00 AM    GLUCOSEU neg 07/28/2015 12:00 AM    GLUCOSEU neg 09/10/2012 04:15 PM    BILIRUBINUR Negative 08/23/2021 12:00 AM    BILIRUBINUR neg 07/28/2015 12:00 AM    BILIRUBINUR neg 09/10/2012 04:15 PM    KETUA Negative 08/23/2021 12:00 AM    KETUA neg 07/28/2015 12:00 AM    KETUA trace* 09/10/2012 04:15 PM    SPECGRAV 1.020 07/28/2015 12:00 AM    SPECGRAV >=1.030 09/10/2012 04:15 PM    BLOODU Negative 08/23/2021 12:00 AM    BLOODU neg 07/28/2015 12:00 AM    BLOODU neg 09/10/2012 04:15 PM    PHUR 6.5 08/23/2021 12:00 AM    PHUR 7.5 07/28/2015 12:00 AM    PHUR 7.0 09/10/2012 04:15 PM    PROTEINU Negative 08/23/2021 12:00 AM    PROTEINU neg 07/28/2015 12:00 AM    PROTEINU neg 09/10/2012 04:15 PM    UROBILINOGEN Normal 08/23/2021 12:00 AM    NITRU Negative 08/23/2021 12:00 AM    LEUKOCYTESUR Negative 08/23/2021 12:00 AM    LEUKOCYTESUR neg 07/28/2015 12:00 AM    LEUKOCYTESUR neg 09/10/2012 04:15 PM     Urine Micro/Albumin Ratio  No results found for: MALBCR      ROS:  Const: Denies chills, fever, malaise and sweats. Eyes: Denies discharge, pain, redness and visual disturbance. ENMT: Denies earaches, other ear symptoms. Denies nasal or sinus symptoms other than stated  above.  Denies

## 2023-07-24 ENCOUNTER — NURSE ONLY (OUTPATIENT)
Dept: PRIMARY CARE CLINIC | Age: 23
End: 2023-07-24
Payer: COMMERCIAL

## 2023-07-24 DIAGNOSIS — Z23 NEED FOR HPV VACCINE: Primary | ICD-10-CM

## 2023-07-24 PROCEDURE — 90471 IMMUNIZATION ADMIN: CPT | Performed by: FAMILY MEDICINE

## 2023-07-24 PROCEDURE — 90651 9VHPV VACCINE 2/3 DOSE IM: CPT | Performed by: FAMILY MEDICINE

## 2023-11-27 ENCOUNTER — OFFICE VISIT (OUTPATIENT)
Dept: PRIMARY CARE CLINIC | Age: 23
End: 2023-11-27

## 2023-11-27 VITALS
HEIGHT: 67 IN | DIASTOLIC BLOOD PRESSURE: 83 MMHG | SYSTOLIC BLOOD PRESSURE: 123 MMHG | OXYGEN SATURATION: 97 % | BODY MASS INDEX: 29.88 KG/M2 | WEIGHT: 190.4 LBS | HEART RATE: 94 BPM | TEMPERATURE: 97.4 F | RESPIRATION RATE: 16 BRPM

## 2023-11-27 DIAGNOSIS — H10.9 CONJUNCTIVITIS OF LEFT EYE, UNSPECIFIED CONJUNCTIVITIS TYPE: Primary | ICD-10-CM

## 2023-11-27 DIAGNOSIS — J02.9 SORETHROAT: ICD-10-CM

## 2023-11-27 LAB — S PYO AG THROAT QL: NORMAL

## 2023-11-27 PROCEDURE — 99203 OFFICE O/P NEW LOW 30 MIN: CPT | Performed by: NURSE PRACTITIONER

## 2023-11-27 PROCEDURE — 87880 STREP A ASSAY W/OPTIC: CPT | Performed by: NURSE PRACTITIONER

## 2023-11-27 RX ORDER — OFLOXACIN 3 MG/ML
1 SOLUTION/ DROPS OPHTHALMIC 4 TIMES DAILY
Qty: 10 ML | Refills: 0 | Status: SHIPPED | OUTPATIENT
Start: 2023-11-27 | End: 2023-12-07

## 2023-11-27 NOTE — PROGRESS NOTES
development of fever, visual changes, ocular pain/swelling, body aches, or shaking chills. Patient verbalized understanding and is in agreement with this care plan. All questions answered. Return if symptoms worsen or fail to improve. Electronically signed by AMOS Molina CNP   DD: 11/27/23    **This report was transcribed using voice recognition software. Every effort was made to ensure accuracy; however, inadvertent computerized transcription errors may be present.

## 2023-12-05 ENCOUNTER — OFFICE VISIT (OUTPATIENT)
Dept: PRIMARY CARE CLINIC | Age: 23
End: 2023-12-05

## 2023-12-05 VITALS
SYSTOLIC BLOOD PRESSURE: 118 MMHG | TEMPERATURE: 97.9 F | RESPIRATION RATE: 16 BRPM | HEART RATE: 77 BPM | HEIGHT: 67 IN | DIASTOLIC BLOOD PRESSURE: 72 MMHG | BODY MASS INDEX: 29.82 KG/M2 | WEIGHT: 190 LBS | OXYGEN SATURATION: 98 %

## 2023-12-05 DIAGNOSIS — Z76.89 RETURN TO WORK EVALUATION: ICD-10-CM

## 2023-12-05 DIAGNOSIS — H10.9 CONJUNCTIVITIS OF LEFT EYE, UNSPECIFIED CONJUNCTIVITIS TYPE: Primary | ICD-10-CM

## 2023-12-06 ASSESSMENT — VISUAL ACUITY: OU: 1

## 2024-02-22 ENCOUNTER — OFFICE VISIT (OUTPATIENT)
Dept: PRIMARY CARE CLINIC | Age: 24
End: 2024-02-22

## 2024-02-22 VITALS
BODY MASS INDEX: 28.14 KG/M2 | HEART RATE: 85 BPM | TEMPERATURE: 98.1 F | DIASTOLIC BLOOD PRESSURE: 82 MMHG | WEIGHT: 190 LBS | SYSTOLIC BLOOD PRESSURE: 128 MMHG | RESPIRATION RATE: 16 BRPM | HEIGHT: 69 IN | OXYGEN SATURATION: 98 %

## 2024-02-22 DIAGNOSIS — J01.90 ACUTE VIRAL SINUSITIS: Primary | ICD-10-CM

## 2024-02-22 DIAGNOSIS — R05.8 DRY COUGH: ICD-10-CM

## 2024-02-22 DIAGNOSIS — B97.89 ACUTE VIRAL SINUSITIS: Primary | ICD-10-CM

## 2024-02-22 LAB
INFLUENZA A ANTIBODY: NEGATIVE
INFLUENZA B ANTIBODY: NEGATIVE
Lab: NORMAL
PERFORMING INSTRUMENT: NORMAL
QC PASS/FAIL: NORMAL
SARS-COV-2, POC: NORMAL

## 2024-02-22 PROCEDURE — 87804 INFLUENZA ASSAY W/OPTIC: CPT | Performed by: NURSE PRACTITIONER

## 2024-02-22 PROCEDURE — 87426 SARSCOV CORONAVIRUS AG IA: CPT | Performed by: NURSE PRACTITIONER

## 2024-02-22 PROCEDURE — 99213 OFFICE O/P EST LOW 20 MIN: CPT | Performed by: NURSE PRACTITIONER

## 2024-02-22 RX ORDER — NORETHINDRONE ACETATE AND ETHINYL ESTRADIOL, AND FERROUS FUMARATE 1MG-20(24)
1 KIT ORAL DAILY
COMMUNITY
Start: 2024-01-08

## 2024-02-22 NOTE — PROGRESS NOTES
Chief Complaint:   Congestion (Sinus pain/pressure, HA (constant) since 02/20/2024. Runny/stuffy nose, dry cough. Took Dayquil and Nitequil which helped. )    History of Present Illness   Source of history provided by:  patient.    Jocy Keith is a 23 y.o. old female who presents to walk-in for nasal congestion, which began 2 day(s) prior to arrival. The symptoms are associated with rhinorrhea, sinus pressure, headache and dry cough.  There has been NO fever, chills, N/V/D, chest pain or SOB. Denies any hx of asthma, COPD, or tobacco use. There has been sick contacts, co-worker with COVID. Has been taking Nyquil and Dayquil for the symptoms with some relief.     Review of Systems   Unless otherwise stated in this report or unable to obtain because of the patient's clinical or mental status as evidenced by the medical record, this patients's positive and negative responses for Review of Systems, constitutional, psych, eyes, ENT, cardiovascular, respiratory, gastrointestinal, neurological, genitourinary, musculoskeletal, integument systems and systems related to the presenting problem are either stated in the preceding or were not pertinent or were negative for the symptoms and/or complaints related to the medical problem.    Past Medical History:  has a past medical history of Fracture, ankle.   Past Surgical History:  has a past surgical history that includes pr open tx distal tibiofibular joint disruption (Right, 7/27/2018).  Social History:  reports that she has never smoked. She has never been exposed to tobacco smoke. She has never used smokeless tobacco. She reports that she does not currently use alcohol. She reports that she does not use drugs.  Family History: family history includes Asthma in her brother; High Blood Pressure in her father and mother.  Allergies: Patient has no known allergies.    Physical Exam   Vital Signs:  /82 (Site: Right Upper Arm, Position: Sitting, Cuff Size: Large Adult)

## 2024-04-16 DIAGNOSIS — F42.2 MIXED OBSESSIONAL THOUGHTS AND ACTS: ICD-10-CM

## 2024-04-16 RX ORDER — FLUOXETINE HYDROCHLORIDE 40 MG/1
40 CAPSULE ORAL DAILY
Qty: 30 CAPSULE | Refills: 0 | Status: SHIPPED | OUTPATIENT
Start: 2024-04-16

## 2024-04-16 NOTE — TELEPHONE ENCOUNTER
Patient needing refill. Last appointment was 3/2023. Out of pills. I did schedule her for 5/1 (needed afternoon due to having finals the next 2 weeks) Can you send over a 30 day supply for her to get her through to her appointment.

## 2024-05-09 ENCOUNTER — OFFICE VISIT (OUTPATIENT)
Dept: PRIMARY CARE CLINIC | Age: 24
End: 2024-05-09

## 2024-05-09 VITALS
OXYGEN SATURATION: 98 % | WEIGHT: 199 LBS | SYSTOLIC BLOOD PRESSURE: 126 MMHG | DIASTOLIC BLOOD PRESSURE: 72 MMHG | BODY MASS INDEX: 29.39 KG/M2 | TEMPERATURE: 98.2 F | HEART RATE: 81 BPM

## 2024-05-09 DIAGNOSIS — F42.2 MIXED OBSESSIONAL THOUGHTS AND ACTS: ICD-10-CM

## 2024-05-09 PROCEDURE — 99214 OFFICE O/P EST MOD 30 MIN: CPT | Performed by: FAMILY MEDICINE

## 2024-05-09 RX ORDER — FLUOXETINE HYDROCHLORIDE 40 MG/1
40 CAPSULE ORAL EVERY EVENING
Qty: 90 CAPSULE | Refills: 3 | Status: SHIPPED | OUTPATIENT
Start: 2024-05-09

## 2024-05-09 RX ORDER — FLUOXETINE HYDROCHLORIDE 20 MG/1
20 CAPSULE ORAL EVERY MORNING
Qty: 30 CAPSULE | Refills: 1 | Status: SHIPPED | OUTPATIENT
Start: 2024-05-09

## 2024-05-09 SDOH — ECONOMIC STABILITY: INCOME INSECURITY: HOW HARD IS IT FOR YOU TO PAY FOR THE VERY BASICS LIKE FOOD, HOUSING, MEDICAL CARE, AND HEATING?: NOT HARD AT ALL

## 2024-05-09 SDOH — ECONOMIC STABILITY: FOOD INSECURITY: WITHIN THE PAST 12 MONTHS, THE FOOD YOU BOUGHT JUST DIDN'T LAST AND YOU DIDN'T HAVE MONEY TO GET MORE.: NEVER TRUE

## 2024-05-09 SDOH — ECONOMIC STABILITY: FOOD INSECURITY: WITHIN THE PAST 12 MONTHS, YOU WORRIED THAT YOUR FOOD WOULD RUN OUT BEFORE YOU GOT MONEY TO BUY MORE.: NEVER TRUE

## 2024-05-09 ASSESSMENT — PATIENT HEALTH QUESTIONNAIRE - PHQ9
SUM OF ALL RESPONSES TO PHQ9 QUESTIONS 1 & 2: 0
2. FEELING DOWN, DEPRESSED OR HOPELESS: NOT AT ALL
SUM OF ALL RESPONSES TO PHQ QUESTIONS 1-9: 0
SUM OF ALL RESPONSES TO PHQ QUESTIONS 1-9: 0
1. LITTLE INTEREST OR PLEASURE IN DOING THINGS: NOT AT ALL
SUM OF ALL RESPONSES TO PHQ QUESTIONS 1-9: 0
SUM OF ALL RESPONSES TO PHQ QUESTIONS 1-9: 0

## 2024-05-09 NOTE — PROGRESS NOTES
Jocy Keith : 2000 Sex: female  Age: 23 y.o.    Chief Complaint   Patient presents with    Discuss Medications     Would like to discuss changing medication or increasing the dose        HPI:   Presents for follow-up.  Self-pay.  Symptoms seem to have plateaued, she has noticed a desire for improvement over the last few months with her OCD and anxiety.  Following with therapist.  No SI/HI.  Tolerating fluoxetine 40 mg well.  Wondering about an increased dose.    She got her the vaccine Fitzpatrick, she is going to check the pharmacy to see which 1 she got so we can give her #2    Not interested in blood work at this time           Most Recent Labs  CBC  Lab Results   Component Value Date/Time    WBC 8.2 2021 12:00 AM    RBC 4.97 2021 12:00 AM    HGB 14.7 2021 12:00 AM    HGB 14.1 2015 12:00 AM    HGB 11.8 09/10/2012 04:13 PM    HCT 42.6 2021 12:00 AM    MCV 85.7 2021 12:00 AM     2021 12:00 AM      CMP  Lab Results   Component Value Date/Time     2021 12:00 AM    K 3.6 2021 12:00 AM     2021 12:00 AM    CO2 26 2021 12:00 AM    GLUCOSE 96 2021 09:48 AM    GLUCOSE 145 2021 12:00 AM    BUN 12 2021 12:00 AM    CREATININE 0.94 2021 12:00 AM    LABGLOM >60 2021 12:00 AM    CALCIUM 8.9 2021 12:00 AM    BILITOT 0.9 2021 12:00 AM    ALKPHOS 81 2021 12:00 AM    AST 14 2021 12:00 AM    ALT 23 2021 12:00 AM     A1C  No results found for: \"LABA1C\"  TSH  Lab Results   Component Value Date/Time    TSH 0.59 2021 12:00 AM     FREET4  No results found for: \"D8LINAN\"  LIPID  Lab Results   Component Value Date/Time    CHOL 176 2021 12:00 AM    HDL 60 2021 12:00 AM    CHOLHDLRATIO 3 2021 12:00 AM     VITAMIN D  No results found for: \"VITD25\"  MAGNESIUM  No results found for: \"MG\"   PHOS  No results found for: \"PHOS\"   JACKY   No results found for: \"JACKY\"  RHEUMATOID

## 2024-05-30 ENCOUNTER — OFFICE VISIT (OUTPATIENT)
Dept: PRIMARY CARE CLINIC | Age: 24
End: 2024-05-30

## 2024-05-30 VITALS
DIASTOLIC BLOOD PRESSURE: 60 MMHG | BODY MASS INDEX: 28.94 KG/M2 | HEART RATE: 90 BPM | OXYGEN SATURATION: 98 % | TEMPERATURE: 97.7 F | WEIGHT: 196 LBS | SYSTOLIC BLOOD PRESSURE: 122 MMHG

## 2024-05-30 DIAGNOSIS — F42.2 MIXED OBSESSIONAL THOUGHTS AND ACTS: ICD-10-CM

## 2024-05-30 PROCEDURE — 99213 OFFICE O/P EST LOW 20 MIN: CPT | Performed by: FAMILY MEDICINE

## 2024-05-30 RX ORDER — FLUOXETINE HYDROCHLORIDE 20 MG/1
20 CAPSULE ORAL EVERY MORNING
Qty: 90 CAPSULE | Refills: 1 | Status: SHIPPED | OUTPATIENT
Start: 2024-05-30

## 2024-05-30 NOTE — PROGRESS NOTES
I notified the patient that a prescription was sent to his pharmacy.   40 mg, pros and cons reviewed.  Then worsened again, 5/9/2024 increase to 60 mg (40+20), standard precautions. Side effects instructions ADRs reviewed. Adamantly denies SI/HI.  Pregnancy precautions reviewed.      No significant improvement but tolerating.  She is going to give this another 2 months and consider increasing to 40 mg twice a day.  Neck step would be psychiatry.  No SI/HI.  Encourage new counselor and goals of counseling reviewed      Hyperglycemia  This was nonfasting after significant sugar intake, repeat the next day was 96.  Wish additional blood work now      Plan as above.  Counseled extensively and differential diagnoses relevant to above were reviewed, including serious etiologies, risks and complications, especially of left uncontrolled.  If relevant, instructions and  alternatives to meds/treatment reviewed, as well as interactions, and  SE's/ADRs reviewed, notify immediately if any, discontinuing new meds if any.  Plan made after discussion and shared decision making.    As above.  Follow-up 2 months sooner as needed    As long as symptoms steadily improve/resolve, and medical conditions follow the expected course, FU as below, sooner PRN.    Return in about 2 months (around 7/30/2024), or if symptoms worsen or fail to improve.              Educational materials and/or home exercises printed for patient's review and were included in patient instructions on his/her After Visit Summary and given to patient at the end of visit.       After discussion, patient and/or guardian verbalizes understanding, agrees, feels comfortable with and wishes to proceed with above treatment plan. Call for any pending results, FU sooner if abnormal, as needed or if any current symptoms persist/worsen.      Advised patient to call with any new medication issues, and read all Rx info from pharmacy to assure aware of all possible risks and side effects of medication before taking.     Reviewed age and gender

## 2024-08-28 ENCOUNTER — TELEMEDICINE (OUTPATIENT)
Dept: PRIMARY CARE CLINIC | Age: 24
End: 2024-08-28

## 2024-08-28 DIAGNOSIS — F42.2 MIXED OBSESSIONAL THOUGHTS AND ACTS: ICD-10-CM

## 2024-08-28 PROCEDURE — 99213 OFFICE O/P EST LOW 20 MIN: CPT | Performed by: FAMILY MEDICINE

## 2024-08-28 RX ORDER — FLUOXETINE 40 MG/1
40 CAPSULE ORAL 2 TIMES DAILY
Qty: 90 CAPSULE | Refills: 3
Start: 2024-08-28

## 2024-08-28 NOTE — PROGRESS NOTES
TELEHEALTH EVALUATION -- Audio/Visual (During COVID-19 public health emergency)    Chief Complaint   Patient presents with    Medication Check     Fluoxetine  Would like to change med or up dose            HPI:    Jocy Keith (:  2000) has requested an audio/video evaluation for the following concern(s):    Presents for follow-up of OCD.  Intrusive thoughts particularly germ phobia is getting worse instead of better.  She would like to see a specialist and did very well with counselor in the past and EMDR but unaffordable now she has no insurance.  No obvious ill effects to the fluoxetine outside of uncontrolled condition.  Has not tried other medications at this time.      ROS:  Const: Denies chills, fever, malaise and sweats.  Eyes: Denies discharge, pain, redness and visual disturbance.  ENMT: Denies earaches, other ear symptoms. Denies nasal or sinus symptoms other than stated  above. Denies mouth and tongue lesions and sore throat.  CV: Denies chest discomfort, pain; diaphoresis, dizziness, edema, lightheadedness, orthopnea,  palpitations, syncope and near syncopal episode or any exertional symptoms  Resp: Denies cough, hemoptysis, pleuritic pain, SOB, sputum production and wheezing.  GI: Denies abdominal pain, change in bowel habits, hematochezia, melena, nausea and vomiting.  : Denies urinary symptoms including dysuria , urgency, frequency or hematuria.  Musculo: Denies musculoskeletal symptoms.  Skin: Denies bruising and rash.  Neuro: Denies headache, numbness, stiff neck, tingling and focal weakness slurred speech or facial  droop  Hema/Lymph: Denies bleeding/bruising tendency and enlarged lymph nodes         Current Outpatient Medications:     FLUoxetine (PROZAC) 40 MG capsule, Take 1 capsule by mouth 2 times daily, Disp: 90 capsule, Rfl: 3  No Known Allergies    Past Medical History:   Diagnosis Date    Fracture, ankle 2018    Right - for OR 20      Past Surgical History:   Procedure

## 2024-10-22 DIAGNOSIS — F42.2 MIXED OBSESSIONAL THOUGHTS AND ACTS: ICD-10-CM

## 2024-10-22 RX ORDER — FLUOXETINE 40 MG/1
40 CAPSULE ORAL 2 TIMES DAILY
Qty: 60 CAPSULE | Refills: 0 | Status: SHIPPED | OUTPATIENT
Start: 2024-10-22

## 2024-11-26 DIAGNOSIS — F42.2 MIXED OBSESSIONAL THOUGHTS AND ACTS: ICD-10-CM

## 2024-11-26 RX ORDER — FLUOXETINE 40 MG/1
40 CAPSULE ORAL 2 TIMES DAILY
Qty: 60 CAPSULE | Refills: 0 | Status: SHIPPED | OUTPATIENT
Start: 2024-11-26

## 2024-12-27 DIAGNOSIS — F42.2 MIXED OBSESSIONAL THOUGHTS AND ACTS: ICD-10-CM

## 2024-12-30 DIAGNOSIS — F42.2 MIXED OBSESSIONAL THOUGHTS AND ACTS: ICD-10-CM

## 2025-01-03 NOTE — TELEPHONE ENCOUNTER
Name of Medication(s) Requested:  Requested Prescriptions     Pending Prescriptions Disp Refills    FLUoxetine (PROZAC) 40 MG capsule 60 capsule 0     Sig: Take 1 capsule by mouth 2 times daily       Medication is on current medication list Yes    Dosage and directions were verified? Yes    Quantity verified: 30 day supply     Pharmacy Verified?  Yes    Last Appointment:  8/28/2024    Future appts:  No future appointments.     (If no appt send self scheduling link. .REFILLAPPT)  Scheduling request sent?     [] Yes  [] No    Does patient need updated?  [] Yes  [] No

## 2025-01-03 NOTE — TELEPHONE ENCOUNTER
Name of Medication(s) Requested:  Requested Prescriptions     Pending Prescriptions Disp Refills    FLUoxetine (PROZAC) 40 MG capsule 180 capsule 1     Sig: Take 1 capsule by mouth 2 times daily       Medication is on current medication list Yes    Dosage and directions were verified? Yes    Quantity verified: 90 day supply     Pharmacy Verified?  Yes    Last Appointment:  8/28/2024    Future appts:  No future appointments.     (If no appt send self scheduling link. .REFILLAPPT)  Scheduling request sent?     [] Yes  [x] No    Does patient need updated?  [] Yes  [x] No        Patient doing well on current dose.

## 2025-01-06 RX ORDER — FLUOXETINE HYDROCHLORIDE 40 MG/1
40 CAPSULE ORAL 2 TIMES DAILY
Qty: 180 CAPSULE | Refills: 1 | Status: SHIPPED | OUTPATIENT
Start: 2025-01-06

## 2025-01-06 RX ORDER — FLUOXETINE HYDROCHLORIDE 40 MG/1
40 CAPSULE ORAL 2 TIMES DAILY
Qty: 60 CAPSULE | Refills: 0 | OUTPATIENT
Start: 2025-01-06

## 2025-01-17 ENCOUNTER — TELEMEDICINE (OUTPATIENT)
Dept: PRIMARY CARE CLINIC | Age: 25
End: 2025-01-17

## 2025-01-17 DIAGNOSIS — R19.8 CHANGE IN BOWEL FUNCTION: ICD-10-CM

## 2025-01-17 DIAGNOSIS — R10.13 DYSPEPSIA: ICD-10-CM

## 2025-01-17 DIAGNOSIS — K21.9 GASTROESOPHAGEAL REFLUX DISEASE, UNSPECIFIED WHETHER ESOPHAGITIS PRESENT: ICD-10-CM

## 2025-01-17 DIAGNOSIS — F42.2 MIXED OBSESSIONAL THOUGHTS AND ACTS: Primary | ICD-10-CM

## 2025-01-17 DIAGNOSIS — R14.0 BLOATING: ICD-10-CM

## 2025-01-17 DIAGNOSIS — Z00.00 HEALTH MAINTENANCE EXAMINATION: ICD-10-CM

## 2025-01-17 RX ORDER — PANTOPRAZOLE SODIUM 40 MG/1
40 TABLET, DELAYED RELEASE ORAL DAILY
Qty: 30 TABLET | Refills: 1 | Status: SHIPPED | OUTPATIENT
Start: 2025-01-17

## 2025-01-17 SDOH — ECONOMIC STABILITY: FOOD INSECURITY: WITHIN THE PAST 12 MONTHS, THE FOOD YOU BOUGHT JUST DIDN'T LAST AND YOU DIDN'T HAVE MONEY TO GET MORE.: NEVER TRUE

## 2025-01-17 SDOH — ECONOMIC STABILITY: FOOD INSECURITY: WITHIN THE PAST 12 MONTHS, YOU WORRIED THAT YOUR FOOD WOULD RUN OUT BEFORE YOU GOT MONEY TO BUY MORE.: NEVER TRUE

## 2025-01-17 ASSESSMENT — PATIENT HEALTH QUESTIONNAIRE - PHQ9
SUM OF ALL RESPONSES TO PHQ QUESTIONS 1-9: 0
SUM OF ALL RESPONSES TO PHQ QUESTIONS 1-9: 0
SUM OF ALL RESPONSES TO PHQ9 QUESTIONS 1 & 2: 0
SUM OF ALL RESPONSES TO PHQ QUESTIONS 1-9: 0
2. FEELING DOWN, DEPRESSED OR HOPELESS: NOT AT ALL
1. LITTLE INTEREST OR PLEASURE IN DOING THINGS: NOT AT ALL
SUM OF ALL RESPONSES TO PHQ QUESTIONS 1-9: 0

## 2025-01-17 NOTE — PROGRESS NOTES
TELEHEALTH EVALUATION -- Audio/Visual (During COVID-19 public health emergency)    Chief Complaint   Patient presents with    Medication Check     Fluoxetine            HPI:    Jocy Keith (:  2000) has requested an audio/video evaluation for the following concern(s):    Presents for several.  In regard to follow-up OCD, doing very well on fluoxetine she states.  She has tolerated and would like to continue.  No longer following with counselor because of cost, did well with EMDR in the past.  No SI/HI    Also having progressive abdominal symptoms over some time.  Bowel movements have reduced to every 3 to 5 days but soft.  Bloating and gassy when she eats with reflux symptoms.  Plain chicken is tolerable and chicken noodle soup other things cause issues.  No other pain fever chills or change in weight.  No other complaints or concerns    She wants to limit testing because of cost.  She will check into pricing before any testing    ROS:  Const: Denies chills, fever, malaise and sweats.  Eyes: Denies discharge, pain, redness and visual disturbance.  ENMT: Denies earaches, other ear symptoms. Denies nasal or sinus symptoms other than stated  above. Denies mouth and tongue lesions and sore throat.  CV: Denies chest discomfort, pain; diaphoresis, dizziness, edema, lightheadedness, orthopnea,  palpitations, syncope and near syncopal episode or any exertional symptoms  Resp: Denies cough, hemoptysis, pleuritic pain, SOB, sputum production and wheezing.  GI: Denieshematochezia, melena, nausea and vomiting.  : Denies urinary symptoms including dysuria , urgency, frequency or hematuria.  Musculo: Denies musculoskeletal symptoms.  Skin: Denies bruising and rash.  Neuro: Denies headache, numbness, stiff neck, tingling and focal weakness slurred speech or facial  droop  Hema/Lymph: Denies bleeding/bruising tendency and enlarged lymph nodes         Current Outpatient Medications:     pantoprazole (PROTONIX) 40 MG

## 2025-07-14 ENCOUNTER — PATIENT MESSAGE (OUTPATIENT)
Dept: PRIMARY CARE CLINIC | Age: 25
End: 2025-07-14

## 2025-07-14 ENCOUNTER — TELEPHONE (OUTPATIENT)
Dept: PRIMARY CARE CLINIC | Age: 25
End: 2025-07-14

## 2025-07-14 DIAGNOSIS — R10.13 DYSPEPSIA: ICD-10-CM

## 2025-07-14 DIAGNOSIS — Z00.00 HEALTH MAINTENANCE EXAMINATION: ICD-10-CM

## 2025-07-14 DIAGNOSIS — R14.0 BLOATING: ICD-10-CM

## 2025-07-14 DIAGNOSIS — Z11.1 TUBERCULOSIS SCREENING: ICD-10-CM

## 2025-07-14 DIAGNOSIS — Z11.1 TUBERCULOSIS SCREENING: Primary | ICD-10-CM

## 2025-07-14 LAB
ALBUMIN: 4.5 G/DL (ref 3.5–5.2)
ALP BLD-CCNC: 90 U/L (ref 35–104)
ALT SERPL-CCNC: 24 U/L (ref 0–35)
ANION GAP SERPL CALCULATED.3IONS-SCNC: 11 MMOL/L (ref 7–16)
AST SERPL-CCNC: 22 U/L (ref 0–35)
BACTERIA: ABNORMAL
BASOPHILS ABSOLUTE: 0.03 K/UL (ref 0–0.2)
BASOPHILS RELATIVE PERCENT: 0 % (ref 0–2)
BILIRUB SERPL-MCNC: 0.4 MG/DL (ref 0–1.2)
BILIRUBIN, URINE: NEGATIVE
BUN BLDV-MCNC: 17 MG/DL (ref 6–20)
CALCIUM SERPL-MCNC: 9.4 MG/DL (ref 8.6–10)
CHLORIDE BLD-SCNC: 101 MMOL/L (ref 98–107)
CHOLESTEROL, TOTAL: 188 MG/DL
CO2: 24 MMOL/L (ref 22–29)
COLOR, UA: YELLOW
CREAT SERPL-MCNC: 1 MG/DL (ref 0.5–1)
EOSINOPHILS ABSOLUTE: 0.14 K/UL (ref 0.05–0.5)
EOSINOPHILS RELATIVE PERCENT: 2 % (ref 0–6)
GFR, ESTIMATED: 82 ML/MIN/1.73M2
GLUCOSE BLD-MCNC: 95 MG/DL (ref 74–99)
GLUCOSE URINE: NEGATIVE MG/DL
HCT VFR BLD CALC: 40.3 % (ref 34–48)
HDLC SERPL-MCNC: 51 MG/DL
HEMOGLOBIN: 13.7 G/DL (ref 11.5–15.5)
IMMATURE GRANULOCYTES %: 0 % (ref 0–5)
IMMATURE GRANULOCYTES ABSOLUTE: <0.03 K/UL (ref 0–0.58)
KETONES, URINE: NEGATIVE MG/DL
LDL CHOLESTEROL: 126 MG/DL
LEUKOCYTE ESTERASE, URINE: NEGATIVE
LIPASE: 37 U/L (ref 13–60)
LYMPHOCYTES ABSOLUTE: 2.75 K/UL (ref 1.5–4)
LYMPHOCYTES RELATIVE PERCENT: 33 % (ref 20–42)
MCH RBC QN AUTO: 29.7 PG (ref 26–35)
MCHC RBC AUTO-ENTMCNC: 34 G/DL (ref 32–34.5)
MCV RBC AUTO: 87.2 FL (ref 80–99.9)
MONOCYTES ABSOLUTE: 0.71 K/UL (ref 0.1–0.95)
MONOCYTES RELATIVE PERCENT: 9 % (ref 2–12)
NEUTROPHILS ABSOLUTE: 4.65 K/UL (ref 1.8–7.3)
NEUTROPHILS RELATIVE PERCENT: 56 % (ref 43–80)
NITRITE, URINE: POSITIVE
PDW BLD-RTO: 12 % (ref 11.5–15)
PH, URINE: 6 (ref 5–8)
PLATELET # BLD: 246 K/UL (ref 130–450)
PMV BLD AUTO: 10.3 FL (ref 7–12)
POTASSIUM SERPL-SCNC: 4.4 MMOL/L (ref 3.5–5.1)
PROTEIN UA: NEGATIVE MG/DL
RBC # BLD: 4.62 M/UL (ref 3.5–5.5)
RBC UA: ABNORMAL /HPF
SODIUM BLD-SCNC: 137 MMOL/L (ref 136–145)
SPECIFIC GRAVITY UA: 1.02 (ref 1–1.03)
TOTAL PROTEIN: 7.4 G/DL (ref 6.4–8.3)
TRIGL SERPL-MCNC: 55 MG/DL
TSH SERPL DL<=0.05 MIU/L-ACNC: 1.56 UIU/ML (ref 0.27–4.2)
TURBIDITY: ABNORMAL
URINE HGB: NEGATIVE
UROBILINOGEN, URINE: 1 EU/DL (ref 0–1)
VLDLC SERPL CALC-MCNC: 11 MG/DL
WBC # BLD: 8.3 K/UL (ref 4.5–11.5)
WBC UA: ABNORMAL /HPF

## 2025-07-14 NOTE — TELEPHONE ENCOUNTER
Patient is at the lab currently.  Patient states she needs a T spot which there isn't an order for.  Also, they do not do breath tests for H Pylori.  It would need to be a stool kit.

## 2025-07-14 NOTE — TELEPHONE ENCOUNTER
If patient at lab, please call my nurse directly so I can address real-time as in basket messages are sometimes only seen once or twice a day.  Okay for T spot.  Hold on H. pylori for now as it looks like she already left.  Only [facility] does the breath test.  Looks like these were ordered 6 months ago, follow-up

## 2025-07-17 ENCOUNTER — TELEMEDICINE (OUTPATIENT)
Dept: PRIMARY CARE CLINIC | Age: 25
End: 2025-07-17

## 2025-07-17 DIAGNOSIS — F42.2 MIXED OBSESSIONAL THOUGHTS AND ACTS: ICD-10-CM

## 2025-07-17 DIAGNOSIS — Z00.00 HEALTH MAINTENANCE EXAMINATION: ICD-10-CM

## 2025-07-17 DIAGNOSIS — R19.8 CHANGE IN BOWEL FUNCTION: ICD-10-CM

## 2025-07-17 DIAGNOSIS — N30.00 ACUTE CYSTITIS WITHOUT HEMATURIA: Primary | ICD-10-CM

## 2025-07-17 DIAGNOSIS — R10.13 DYSPEPSIA: ICD-10-CM

## 2025-07-17 RX ORDER — FLUOXETINE HYDROCHLORIDE 40 MG/1
40 CAPSULE ORAL 2 TIMES DAILY
Qty: 180 CAPSULE | Refills: 3 | Status: SHIPPED | OUTPATIENT
Start: 2025-07-17

## 2025-07-17 NOTE — PROGRESS NOTES
insurance company to ensure coverage and to fully understand benefits and cost prior to any testing. This note was created with the assistance of voice recognition software.  Document was reviewed however may contain grammatical errors.  This note or partial portions of this note may have been created using a copy forward or copy paste feature but these portions have been verified and re-edited for accuracy and any portions not in need of editing or reviews are not being used to generate any component necessary for billing purposes.  Some portions may be carried over for continuity purposes and to aid me with monitoring of past medical conditions and discussions.  Elements necessary for proper CPT code selection are based only on elements of the visit that are truly unique to this visit.             --Chun Marvin MD on 7/17/2025 at 1:30 PM    An electronic signature was used to authenticate this note.

## 2025-07-18 LAB — T SPOT TB TEST: NORMAL

## (undated) DEVICE — PACK PROCEDURE SURG GEN CUST

## (undated) DEVICE — GAUZE,SPONGE,4"X4",8PLY,STRL,LF,10/TRAY: Brand: MEDLINE

## (undated) DEVICE — ZIMMER® STERILE DISPOSABLE TOURNIQUET CUFF WITH PLC, DUAL PORT, SINGLE BLADDER, 30 IN. (76 CM)

## (undated) DEVICE — TUBING, SUCTION, 9/32" X 10', STRAIGHT: Brand: MEDLINE

## (undated) DEVICE — DRESSING,GAUZE,XEROFORM,CURAD,1"X8",ST: Brand: CURAD

## (undated) DEVICE — BIT DRL L160MM DIA2.7MM ST CANN QUIK CPL NONRADIOLUCENT ADJ

## (undated) DEVICE — PLATE ES AD W 9FT CRD 2

## (undated) DEVICE — STANDARD HYPODERMIC NEEDLE,POLYPROPYLENE HUB: Brand: MONOJECT

## (undated) DEVICE — DRAPE C ARM W41XL74IN UNIV MOB W RUBBERBAND CLP

## (undated) DEVICE — CONTROL SYRINGE LUER-LOCK TIP: Brand: MONOJECT

## (undated) DEVICE — GOWN,SIRUS,FABRNF,XL,20/CS: Brand: MEDLINE

## (undated) DEVICE — GUIDEWIRE ORTH L150MM DIA1.25MM S STL NTHRD FOR 4MM CANN

## (undated) DEVICE — BANDAGE COBAN 4 IN COMPR W4INXL5YD FOAM COHESIVE QUIK STK SELF ADH SFT

## (undated) DEVICE — BANDAGE COMPR W6INXL12FT SMOOTH FOR LIMB EXSANG ESMARCH

## (undated) DEVICE — TUBING SUCT 12FR MAL ALUM SHFT FN CAP VENT UNIV CONN W/ OBT

## (undated) DEVICE — 4-PORT MANIFOLD: Brand: NEPTUNE 2

## (undated) DEVICE — BIT DRL L110MM DIA2.5MM ST G QUIK CPL NONRADIOPAQUE W/O STP

## (undated) DEVICE — DRAPE,EXTREMITY,89X128,STERILE: Brand: MEDLINE

## (undated) DEVICE — DOUBLE BASIN SET: Brand: MEDLINE INDUSTRIES, INC.

## (undated) DEVICE — PADDING CAST W6INXL4YD COT LO LINTING WYTEX

## (undated) DEVICE — PAD,ABDOMINAL,5"X9",ST,LF,25/BX: Brand: MEDLINE INDUSTRIES, INC.

## (undated) DEVICE — BNDG,ELSTC,MATRIX,STRL,6"X5YD,LF,HOOK&LP: Brand: MEDLINE

## (undated) DEVICE — STERILE HOOK LOCK LATEX FREE ELASTIC BANDAGE 4INX5YD: Brand: HOOK LOCK™

## (undated) DEVICE — INTENDED FOR TISSUE SEPARATION, AND OTHER PROCEDURES THAT REQUIRE A SHARP SURGICAL BLADE TO PUNCTURE OR CUT.: Brand: BARD-PARKER ® STAINLESS STEEL BLADES

## (undated) DEVICE — PADDING,UNDERCAST,COTTON, 4"X4YD STERILE: Brand: MEDLINE

## (undated) DEVICE — 1000 S-DRAPE TOWEL DRAPE 10/BX: Brand: STERI-DRAPE™

## (undated) DEVICE — TOWEL,OR,DSP,ST,BLUE,STD,6/PK,12PK/CS: Brand: MEDLINE

## (undated) DEVICE — DRAPE,U/ SHT,SPLIT,PLAS,STERIL: Brand: MEDLINE

## (undated) DEVICE — SCREW BNE L16MM DIA3.5MM CORT S STL ST NONCANNULATED LOK
Type: IMPLANTABLE DEVICE | Site: ANKLE | Status: NON-FUNCTIONAL
Removed: 2018-07-27

## (undated) DEVICE — DRAPE,REIN 53X77,STERILE: Brand: MEDLINE

## (undated) DEVICE — Device

## (undated) DEVICE — CHLORAPREP 26ML ORANGE